# Patient Record
Sex: MALE | Race: WHITE | URBAN - METROPOLITAN AREA
[De-identification: names, ages, dates, MRNs, and addresses within clinical notes are randomized per-mention and may not be internally consistent; named-entity substitution may affect disease eponyms.]

---

## 2019-02-01 ENCOUNTER — MEDICAL CORRESPONDENCE (OUTPATIENT)
Dept: HEALTH INFORMATION MANAGEMENT | Facility: CLINIC | Age: 74
End: 2019-02-01

## 2019-02-01 ENCOUNTER — TRANSFERRED RECORDS (OUTPATIENT)
Dept: HEALTH INFORMATION MANAGEMENT | Facility: CLINIC | Age: 74
End: 2019-02-01

## 2019-03-26 ENCOUNTER — OFFICE VISIT (OUTPATIENT)
Dept: OPHTHALMOLOGY | Facility: CLINIC | Age: 74
End: 2019-03-26
Attending: OPHTHALMOLOGY
Payer: MEDICARE

## 2019-03-26 DIAGNOSIS — H53.462 HOMONYMOUS HEMIANOPIA, LEFT: Primary | ICD-10-CM

## 2019-03-26 DIAGNOSIS — H53.10 SUBJECTIVE VISUAL DISTURBANCE: Primary | ICD-10-CM

## 2019-03-26 PROBLEM — R27.0 ATAXIA: Status: ACTIVE | Noted: 2019-03-05

## 2019-03-26 PROBLEM — Z95.2 S/P TAVR (TRANSCATHETER AORTIC VALVE REPLACEMENT): Status: ACTIVE | Noted: 2018-09-17

## 2019-03-26 PROBLEM — I63.431 CEREBROVASCULAR ACCIDENT (CVA) DUE TO EMBOLISM OF RIGHT POSTERIOR CEREBRAL ARTERY (H): Status: ACTIVE | Noted: 2018-10-19

## 2019-03-26 PROCEDURE — G0463 HOSPITAL OUTPT CLINIC VISIT: HCPCS | Mod: ZF

## 2019-03-26 PROCEDURE — 92081 LIMITED VISUAL FIELD XM: CPT | Mod: ZF | Performed by: OPHTHALMOLOGY

## 2019-03-26 RX ORDER — LISINOPRIL 10 MG/1
TABLET ORAL
Refills: 3 | COMMUNITY
Start: 2019-02-13

## 2019-03-26 RX ORDER — ROSUVASTATIN CALCIUM 40 MG/1
40 TABLET, COATED ORAL
COMMUNITY
Start: 2019-03-05

## 2019-03-26 RX ORDER — MULTIVIT-MIN/IRON FUM/FOLIC AC 7.5 MG-4
1 TABLET ORAL
COMMUNITY
Start: 2018-11-02

## 2019-03-26 RX ORDER — ATORVASTATIN CALCIUM 40 MG/1
TABLET, FILM COATED ORAL
Refills: 3 | COMMUNITY
Start: 2019-01-07

## 2019-03-26 ASSESSMENT — REFRACTION_WEARINGRX
OS_ADD: +3.00
OD_SPHERE: +1.50
OD_CYLINDER: +0.25
OS_AXIS: 016
OD_ADD: +3.00
OS_SPHERE: +1.50
OS_CYLINDER: +1.25
OD_AXIS: 156

## 2019-03-26 ASSESSMENT — VISUAL ACUITY
METHOD: SNELLEN - LINEAR
OD_CC: 20/20
CORRECTION_TYPE: GLASSES
OS_CC: 20/20

## 2019-03-26 ASSESSMENT — CONF VISUAL FIELD
OS_NORMAL: 1
OD_NORMAL: 1

## 2019-03-26 ASSESSMENT — SLIT LAMP EXAM - LIDS
COMMENTS: NORMAL
COMMENTS: NORMAL

## 2019-03-26 ASSESSMENT — TONOMETRY
OD_IOP_MMHG: 12
IOP_METHOD: ICARE
OS_IOP_MMHG: 16

## 2019-03-26 ASSESSMENT — EXTERNAL EXAM - LEFT EYE: OS_EXAM: NORMAL

## 2019-03-26 ASSESSMENT — EXTERNAL EXAM - RIGHT EYE: OD_EXAM: NORMAL

## 2019-03-26 ASSESSMENT — CUP TO DISC RATIO
OS_RATIO: 0.4
OD_RATIO: 0.5

## 2019-03-26 NOTE — PROGRESS NOTES
Assessment & Plan     Sergio Bejarano is a 73 year old male with the following diagnoses:   1. Homonymous hemianopia, left           Sergio Bejarano is a 73 year old male with a history of hemorrhagic right occipital infarct, subdural hemorrhages, hydrocephalus s/p shunt placement, and aortic stenosis s/p TVAR presents for evaluation of vision changes. Reports had dark spots when looking at faces on the TV. Unsure if these were present when looking at people in real life but wife says she did not hear him mention any issues with vision when not looking at the tv. Patient denies any changes to their vision, flashes, floaters, pain, redness, discharge, diplopia, or photophobia.    On exam visual acuity is 20/20 in the right and 20/20 in the left. Pupil exam is normal with no afferent pupillary defect. Color plates are full with 11 out of 11 in both eyes. Extraocular movements are full and normal. Anterior segment exam shows nuclear sclerosis and cortical cataract in both eyes. Rest of the anterior segment exam is normal in both eyes. Fundus exam is normal in both eyes. There is no optic disc swelling in either eye.    Kinetic visual field shows homonymous hemianopia in both eyes. Retinal nerve fiber layer is normal in both eyes.    In summary, Sergio has subjective visual changes that has now resolved but he has a persistent visual field defect consistent with his right occipital lobe hemorrhage. His visual acuity is 20/20 in both eyes.     It is my impression that patient has a left homonymous hemianopia following hemorrhagic right occipital infarct.  We discussed that he currently does not meet the requirements to drive in the State [place].  Follow up in 3 months for repeat visual field testing.           Attending Physician Attestation:  Complete documentation of historical and exam elements from today's encounter can be found in the full encounter summary report (not reduplicated in this progress note).  I  personally obtained the chief complaint(s) and history of present illness.  I confirmed and edited as necessary the review of systems, past medical/surgical history, family history, social history, and examination findings as documented by others; and I examined the patient myself.  I personally reviewed the relevant tests, images, and reports as documented above.  I formulated and edited as necessary the assessment and plan and discussed the findings and management plan with the patient and family. - Navin Johnson MD'    Cyrus Kebede MD  PGY-3 Ophthalmology Resident  [phone]

## 2019-03-26 NOTE — NURSING NOTE
Chief Complaints and History of Present Illnesses   Patient presents with     Blurred Vision Evaluation     Chief Complaint(s) and History of Present Illness(es)     Sergio Bejarano is a 73 year old male who presents today for    1. CVA  Sustained stroke October 2018.   No diplopia  No blurred vision  Chief complaint: faces are dark.     Trang RANDOLPH 12:21 PM March 26, 2019

## 2019-03-26 NOTE — LETTER
3/26/2019         RE:  :  MRN: [id number]  1945  [id number]     Dear FIGUEROA Stevenson,    Thank you for asking me to see your very pleasant patient, Sergio Bejarano, in neuro-ophthalmic consultation.  I would like to thank you for sending your records and I have summarized them in the history of present illness. He presented with his spouse and son who provided additional history.  My assessment and plan are below.  For further details, please see my attached clinic note.      Assessment & Plan     Sergio Bejarano is a 73 year old male with the following diagnoses:   1. Homonymous hemianopia, left       Sergio Bejarano is a 73 year old male with a history of hemorrhagic right occipital infarct, subdural hemorrhages, hydrocephalus s/p shunt placement, and aortic stenosis s/p TVAR presents for evaluation of vision changes. Reports had dark spots when looking at faces on the TV. Unsure if these were present when looking at people in real life but wife says she did not hear him mention any issues with vision when not looking at the tv. Patient denies any changes to their vision, flashes, floaters, pain, redness, discharge, diplopia, or photophobia.    On exam visual acuity is 20/20 in the right and 20/20 in the left. Pupil exam is normal with no afferent pupillary defect. Color plates are full with 11 out of 11 in both eyes. Extraocular movements are full and normal. Anterior segment exam shows nuclear sclerosis and cortical cataract in both eyes. Rest of the anterior segment exam is normal in both eyes. Fundus exam is normal in both eyes. There is no optic disc swelling in either eye.    Kinetic visual field shows homonymous hemianopia in both eyes. Retinal nerve fiber layer is normal in both eyes.    In summary, Sergio has subjective visual changes that has now resolved but he has a persistent visual field defect consistent with his right occipital lobe hemorrhage. His visual acuity is 20/20 in both eyes.     It is my impression  that patient has a left homonymous hemianopia following hemorrhagic right occipital infarct.  We discussed that he currently does not meet the requirements to drive in the [facility].  Follow up in 3 months for repeat visual field testing.      Again, thank you for allowing me to participate in the care of your patient.      Sincerely,    Navin Johnson MD  Professor  Ophthalmology Residency   Director of Neuro-Ophthalmology  [place] - Scheie Endow Chair  Departments of Ophthalmology, Neurology, and Neurosurgery  [facility] [address] [place] [address]  [id number]  T - [phone]  F - [phone]  ANNETTE [email or web address]      CC: Linda Stevenson NP  [facility]  [address] [place] [id number]  VIA Facsimile: [phone]       DX = homonymous hemianopia, stroke

## 2022-01-17 ENCOUNTER — MEDICAL CORRESPONDENCE (OUTPATIENT)
Dept: HEALTH INFORMATION MANAGEMENT | Facility: CLINIC | Age: 77
End: 2022-01-17
Payer: MEDICARE

## 2022-01-19 ENCOUNTER — TRANSCRIBE ORDERS (OUTPATIENT)
Dept: OTHER | Age: 77
End: 2022-01-19

## 2022-01-19 DIAGNOSIS — K22.0 ACHALASIA: Primary | ICD-10-CM
